# Patient Record
Sex: FEMALE | NOT HISPANIC OR LATINO | URBAN - METROPOLITAN AREA
[De-identification: names, ages, dates, MRNs, and addresses within clinical notes are randomized per-mention and may not be internally consistent; named-entity substitution may affect disease eponyms.]

---

## 2018-09-05 NOTE — PATIENT DISCUSSION
Discussion on adaptation and expectations with multifocal SCLs. Try at home for few days. Will order other options as trials if needed for follow up.

## 2018-09-19 NOTE — PATIENT DISCUSSION
After multiple trials of both MF and Mono correction in SCL it was determined that pt is unable to adapt to the dist blur the MF CL induce, or the decrease in depth perception the Mono CL option causes. Ultimately pt elects to use OTC near as needed. Discussed PAL with transitions as well.

## 2019-01-30 ENCOUNTER — IMPORTED ENCOUNTER (OUTPATIENT)
Dept: URBAN - METROPOLITAN AREA CLINIC 1 | Facility: CLINIC | Age: 64
End: 2019-01-30

## 2019-01-30 PROBLEM — H52.4: Noted: 2019-01-30

## 2019-01-30 PROCEDURE — 92004 COMPRE OPH EXAM NEW PT 1/>: CPT

## 2019-01-30 NOTE — PATIENT DISCUSSION
1.  Presbyopia: Rx was given for corrective spectacles if indicated. 2.  EARL OU-REC patient to start using AT BID OU3. Cataracts OU-observe for now 4. Return for an appointment in 1 year for 40. with Dr. Susan Finley.

## 2021-01-13 ENCOUNTER — IMPORTED ENCOUNTER (OUTPATIENT)
Dept: URBAN - METROPOLITAN AREA CLINIC 1 | Facility: CLINIC | Age: 66
End: 2021-01-13

## 2021-01-13 PROBLEM — H52.12: Noted: 2021-01-13

## 2021-01-13 PROBLEM — H52.223: Noted: 2021-01-13

## 2021-01-13 PROBLEM — H52.4: Noted: 2021-01-13

## 2021-01-13 PROCEDURE — S0621 ROUTINE OPHTHALMOLOGICAL EXA: HCPCS

## 2021-01-13 NOTE — PATIENT DISCUSSION
1. Myopia OS w/ Astgimatism OU -- Rx was given for correction if indicated and requested. 2. Presbyopia 3. EARL w/ PEK OU -- Cont the use of ATs BID-QID OU routinely. 4.  Cataracts OU -- Observe pt noticing glare while driving at night. Will return for medical to evaluate. 5.  () Family Hx Wet WARREN (Mother) Return for an appointment in 6 months 30/Glare with Dr. Katharina Gale.

## 2021-09-15 ENCOUNTER — IMPORTED ENCOUNTER (OUTPATIENT)
Dept: URBAN - METROPOLITAN AREA CLINIC 1 | Facility: CLINIC | Age: 66
End: 2021-09-15

## 2021-09-15 PROBLEM — H16.143: Noted: 2021-09-15

## 2021-09-15 PROBLEM — H25.813: Noted: 2021-09-15

## 2021-09-15 PROBLEM — H04.123: Noted: 2021-09-15

## 2021-09-15 PROCEDURE — 99214 OFFICE O/P EST MOD 30 MIN: CPT

## 2021-09-15 NOTE — PATIENT DISCUSSION
1.  Cataract OU -- Will continue to hold off on surgical intervention at this time continue to monitor for progression. The patient was advised to contact us if any change or worsening of vision2. EARL w/ PEK OU -- Cont ATs TID OU routinely. 3.  () Family Hx ARMD (Mother)Patient deferred MRx returns under vision plan. Return for an appointment in 4-5 months 36 with Dr. Susan Finley. Return for an appointment in 1 year 30/Glare with Dr. Susan Finley.

## 2022-04-02 ASSESSMENT — TONOMETRY
OS_IOP_MMHG: 13
OD_IOP_MMHG: 14
OS_IOP_MMHG: 14
OS_IOP_MMHG: 13
OD_IOP_MMHG: 14
OD_IOP_MMHG: 13

## 2022-04-02 ASSESSMENT — VISUAL ACUITY
OS_CC: J1
OD_CC: J1
OD_SC: 20/20
OS_SC: 20/20
OD_GLARE: 20/20
OS_SC: 20/20
OD_CC: J1
OD_SC: 20/20
OS_SC: 20/20
OD_SC: 20/20
OS_GLARE: 20/20
OS_CC: J1

## 2023-07-19 ENCOUNTER — COMPREHENSIVE EXAM (OUTPATIENT)
Dept: URBAN - METROPOLITAN AREA CLINIC 1 | Facility: CLINIC | Age: 68
End: 2023-07-19

## 2023-07-19 DIAGNOSIS — Z01.00: ICD-10-CM

## 2023-07-19 PROCEDURE — 92015 DETERMINE REFRACTIVE STATE: CPT

## 2023-07-19 PROCEDURE — 92014 COMPRE OPH EXAM EST PT 1/>: CPT

## 2023-07-19 ASSESSMENT — VISUAL ACUITY
OS_CC: J1
OD_CC: J1
OS_CC: 20/20
OD_CC: 20/20

## 2023-07-19 ASSESSMENT — TONOMETRY
OD_IOP_MMHG: 14
OS_IOP_MMHG: 13

## 2024-01-08 ENCOUNTER — COMPREHENSIVE EXAM (OUTPATIENT)
Dept: URBAN - METROPOLITAN AREA CLINIC 1 | Facility: CLINIC | Age: 69
End: 2024-01-08

## 2024-01-08 DIAGNOSIS — H02.831: ICD-10-CM

## 2024-01-08 DIAGNOSIS — H02.834: ICD-10-CM

## 2024-01-08 DIAGNOSIS — H04.123: ICD-10-CM

## 2024-01-08 DIAGNOSIS — H25.813: ICD-10-CM

## 2024-01-08 PROCEDURE — 99214 OFFICE O/P EST MOD 30 MIN: CPT

## 2024-01-08 ASSESSMENT — VISUAL ACUITY
OS_CC: 20/20
OD_CC: 20/20
OS_BAT: 20/30
OD_CC: J1+
OS_CC: J1+
OD_BAT: 20/30

## 2024-01-08 ASSESSMENT — TONOMETRY
OS_IOP_MMHG: 14
OD_IOP_MMHG: 14

## 2024-07-11 ENCOUNTER — COMPREHENSIVE EXAM (OUTPATIENT)
Dept: URBAN - METROPOLITAN AREA CLINIC 1 | Facility: CLINIC | Age: 69
End: 2024-07-11

## 2024-07-11 DIAGNOSIS — Z01.00: ICD-10-CM

## 2024-07-11 PROCEDURE — 92014 COMPRE OPH EXAM EST PT 1/>: CPT

## 2024-07-11 PROCEDURE — 92015 DETERMINE REFRACTIVE STATE: CPT

## 2024-07-11 ASSESSMENT — TONOMETRY
OD_IOP_MMHG: 13
OS_IOP_MMHG: 13

## 2024-07-11 ASSESSMENT — VISUAL ACUITY
OS_CC: J1+
OD_CC: J1+
OS_CC: 20/20
OD_CC: 20/20-1

## 2025-07-23 ENCOUNTER — COMPREHENSIVE EXAM (OUTPATIENT)
Age: 70
End: 2025-07-23

## 2025-07-23 DIAGNOSIS — Z01.00: ICD-10-CM

## 2025-07-23 DIAGNOSIS — H52.223: ICD-10-CM

## 2025-07-23 DIAGNOSIS — H52.4: ICD-10-CM

## 2025-07-23 DIAGNOSIS — H52.11: ICD-10-CM

## 2025-07-23 PROCEDURE — 92015 DETERMINE REFRACTIVE STATE: CPT

## 2025-07-23 PROCEDURE — 92014 COMPRE OPH EXAM EST PT 1/>: CPT
